# Patient Record
Sex: MALE | Race: WHITE
[De-identification: names, ages, dates, MRNs, and addresses within clinical notes are randomized per-mention and may not be internally consistent; named-entity substitution may affect disease eponyms.]

---

## 2017-12-12 NOTE — EDM.PDOC
ED HPI GENERAL MEDICAL PROBLEM





- General


Chief Complaint: General


Stated Complaint: NAUSUA


Time Seen by Provider: 12/12/17 08:53


Source of Information: Reports: Patient


History Limitations: Reports: No Limitations





- History of Present Illness


INITIAL COMMENTS - FREE TEXT/NARRATIVE: 





c/o malaise x 2d





from [place], goes to [facility]





had L TKR in [place] 4d ago, d/c'ed home 3d ago, had malaise x 2d, had apt at 4p 

at [facility] yesterday but "felt too bad to go,", had bitemporal HA 

yesterday PM that was worse on awakening today, took APAP x 2 and decided to 

come to ED





holding head in his hands





also has had urinary freq x 3d, no dysuria, no bladder or prostate problems in 

past





has urge to void, voids only a little, drinking and eating little





pt voided before coming to ED, bladder scan with 880 cc, will place an 

indwelling alvarez





not taken meds for his knee, pain 1/10 for knee and 7/10 for HA, states his 

bladder issue is 7/10 in terms of being troublesome (no pain however)


  ** Headache


Pain Score (Numeric/FACES): 7





- Related Data


 Allergies











Allergy/AdvReac Type Severity Reaction Status Date / Time


 


codeine Allergy  Nausea Verified 12/12/17 08:35


 


Penicillins Allergy  Nausea Verified 12/12/17 08:35











Home Meds: 


 Home Meds





Aspirin [Adult Low Dose Aspirin EC] 81 mg PO DAILY 06/21/14 [History]


Allopurinol [Zyloprim] 100 mg PO DAILY 12/12/17 [History]


Celecoxib [Celecoxib] 200 mg PO DAILY 12/12/17 [History]


oxyCODONE [oxyCODONE] 5 mg PO Q4HR PRN 12/12/17 [History]


traMADol HCl [Tramadol HCl] 50 mg PO Q6HR PRN 12/12/17 [History]











Past Medical History


Musculoskeletal History: Reports: Gout





- Past Surgical History


Musculoskeletal Surgical History: Reports: Hip Replacement, Knee Replacement





Social & Family History





- Tobacco Use


Smoking Status *Q: Never Smoker





- Caffeine Use


Caffeine Use: Reports: Coffee





- Recreational Drug Use


Recreational Drug Use: No





ED ROS GENERAL





- Review of Systems


Review Of Systems: See Below


Constitutional: Reports: Chills, Malaise, Decreased Appetite, Other (has had 

flu vax, no fever).  Denies: Fever, Night Sweats, Diaphoresis


HEENT: Reports: No Symptoms


Respiratory: Reports: No Symptoms


Cardiovascular: Reports: No Symptoms


Endocrine: Reports: No Symptoms


GI/Abdominal: Reports: No Symptoms


: Reports: No Symptoms


Musculoskeletal: Reports: No Symptoms


Skin: Reports: No Symptoms


Neurological: Reports: No Symptoms


Psychiatric: Reports: No Symptoms


Hematologic/Lymphatic: Reports: No Symptoms


Immunologic: Reports: No Symptoms





ED EXAM, GENERAL





- Physical Exam


Exam: See Below


Exam Limited By: No Limitations


General Appearance: Alert, WD/WN, Mild Distress, Other (sitting in chair, 

holding head in his hands, leaning forward)


Eye Exam: Bilateral Eye: Normal Inspection, PERRL


Ears: Normal External Exam, Normal Canal, Hearing Grossly Normal


Nose: Normal Inspection, Normal Mucosa, No Blood


Throat/Mouth: Normal Inspection, Normal Lips, Normal Teeth, Normal Gums, Normal 

Oropharynx, Normal Voice, No Airway Compromise


Head: Atraumatic, Normocephalic


Neck: Normal Inspection, Supple, Non-Tender, Full Range of Motion


Respiratory/Chest: No Respiratory Distress, Lungs Clear, Normal Breath Sounds, 

No Accessory Muscle Use, Chest Non-Tender


Cardiovascular: Regular Rate, Rhythm, No Edema, No Gallop, No JVD, No Murmur, 

No Rub


GI/Abdominal: Normal Bowel Sounds, Soft, No Organomegaly, No Distention, Other (

slight tender LLQ just to L of midline)


Back Exam: Normal Inspection.  No: CVA Tenderness (R), CVA Tenderness (L)


Extremities: Other (trace pretib edema R and trace to 1+ on L)


Neurological: Alert, Oriented, CN II-XII Intact, Normal Cognition, No Motor/

Sensory Deficits


Psychiatric: Normal Affect, Normal Mood


Skin Exam: Warm, Dry, Intact, Normal Color, No Rash


Lymphatic: No Adenopathy





Course





- Vital Signs


Last Recorded V/S: 


 Last Vital Signs











Temp  36.2 C   12/12/17 08:40


 


Pulse  90   12/12/17 08:40


 


Resp  24 H  12/12/17 08:40


 


BP  110/64   12/12/17 08:40


 


Pulse Ox  100   12/12/17 08:40














- Orders/Labs/Meds


Orders: 


 Active Orders 24 hr











 Category Date Time Status


 


 Bladder Scan [RC] ONETIME Care  12/12/17 08:43 Active


 


 Insert Alvarez Catheter [Insert Urinary Catheter] [OM.PC] Care  12/12/17 09:00 

Ordered





 Q24H   


 


 Urinary Catheter Assessment [RC] QSHIFT Care  12/12/17 08:58 Active


 


 PROSTATE SPECIFIC ANTIGEN [REF] Stat Lab  12/12/17 10:57 Ordered


 


 Sodium Chloride 0.9% [Normal Saline] 1,000 ml Med  12/12/17 09:45 Active





 IV ASDIRECTED   


 


 Sodium Chloride 0.9% [Saline Flush] Med  12/12/17 09:12 Active





 10 ml FLUSH ASDIRECTED PRN   


 


 Saline Lock Insert [OM.PC] Routine Oth  12/12/17 09:12 Ordered








 Medication Orders





Sodium Chloride (Normal Saline)  1,000 mls @ 999 mls/hr IV ASDIRECTED ELY


   Last Admin: 12/12/17 10:00  Dose: 999 mls/hr


Sodium Chloride (Saline Flush)  10 ml FLUSH ASDIRECTED PRN


   PRN Reason: Keep Vein Open


   Last Admin: 12/12/17 10:00  Dose: 10 ml


   Admin: 12/12/17 09:30  Dose: 10 ml








Labs: 


 Laboratory Tests











  12/12/17 12/12/17 12/12/17 Range/Units





  09:05 09:05 09:06 


 


WBC  10.6    (4.5-12.0)  X10-3/uL


 


RBC  4.92    (4.30-5.75)  x10(6)uL


 


Hgb  14.7    (11.5-15.5)  g/dL


 


Hct  43.3    (30.0-51.3)  %


 


MCV  88.0    (80-96)  fL


 


MCH  29.8    (27.7-33.6)  pg


 


MCHC  33.9    (32.2-35.4)  g/dL


 


RDW  11.9    (11.5-15.5)  %


 


Plt Count  360    (125-369)  X10(3)uL


 


MPV  7.8    (7.4-10.4)  fL


 


Neut % (Auto)  80.3    (46-82)  %


 


Lymph % (Auto)  13.5    (13-37)  %


 


Mono % (Auto)  5.1    (4-12)  %


 


Eos % (Auto)  1    (1.0-5.0)  %


 


Baso % (Auto)  0    (0-2)  %


 


Neut # (Auto)  8.6 H    (1.6-8.3)  #


 


Lymph # (Auto)  1.4    (0.6-5.0)  #


 


Mono # (Auto)  0.5    (0.0-1.3)  #


 


Eos # (Auto)  0.1    (0.0-0.8)  #


 


Baso # (Auto)  0.0    (0.0-0.2)  #


 


Sodium   140   (135-145)  mmol/L


 


Potassium   3.7   (3.5-5.3)  mmol/L


 


Chloride   102   (100-110)  mmol/L


 


Carbon Dioxide   28   (21-32)  mmol/L


 


BUN   14   (7-18)  mg/dL


 


Creatinine   1.1   (0.70-1.30)  mg/dL


 


Est Cr Clr Drug Dosing   78.38   mL/min


 


Estimated GFR (MDRD)   > 60   (>60)  


 


BUN/Creatinine Ratio   12.7   (9-20)  


 


Glucose   108   ()  mg/dL


 


Calcium   9.3   (8.6-10.2)  mg/dL


 


Total Bilirubin   1.4 H   (0.1-1.3)  mg/dL


 


AST   18   (5-25)  IU/L


 


ALT   24   (12-36)  U/L


 


Alkaline Phosphatase   60   ()  IU/L


 


Total Protein   7.5   (6.0-8.0)  g/dL


 


Albumin   3.3   (3.2-4.6)  g/dL


 


Globulin   4.2   g/dL


 


Albumin/Globulin Ratio   0.8   


 


Urine Color    Yellow  (YELLOW)  


 


Urine Appearance    Clear  (CLEAR)  


 


Urine pH    8.0 H  (5.0-6.5)  


 


Ur Specific Gravity    1.015  (1.010-1.025)  


 


Urine Protein    Negative  (NEGATIVE)  mg/dL


 


Urine Glucose (UA)    Normal  (NEGATIVE)  mg/dL


 


Urine Ketones    Negative  (NEGATIVE)  mg/dL


 


Urine Occult Blood    Negative  (NEGATIVE)  


 


Urine Nitrite    Negative  (NEGATIVE)  


 


Urine Bilirubin    Negative  (NEGATIVE)  


 


Urine Urobilinogen    Normal  (NEGATIVE)  mg/dL


 


Ur Leukocyte Esterase    Negative  (NEGATIVE)  


 


Urine WBC    0-5  (0)  


 


Ur Squamous Epith Cells    Few H  (NS,R,O)  


 


Urine Bacteria    Few H  (NS)  











Meds: 


Medications











Generic Name Dose Route Start Last Admin





  Trade Name Freq  PRN Reason Stop Dose Admin


 


Sodium Chloride  1,000 mls @ 999 mls/hr  12/12/17 09:45  12/12/17 10:00





  Normal Saline  IV   999 mls/hr





  ASDIRECTED ELY   Administration


 


Sodium Chloride  10 ml  12/12/17 09:12  12/12/17 10:00





  Saline Flush  FLUSH   10 ml





  ASDIRECTED PRN   Administration





  Keep Vein Open   














Discontinued Medications














Generic Name Dose Route Start Last Admin





  Trade Name Freq  PRN Reason Stop Dose Admin


 


Ketorolac Tromethamine  30 mg  12/12/17 08:47  12/12/17 09:27





  Toradol  IVPUSH  12/12/17 08:48  30 mg





  ONETIME ONE   Administration


 


Metoclopramide HCl  10 mg  12/12/17 08:48  12/12/17 09:28





  Reglan  IVPUSH  12/12/17 08:49  10 mg





  ONETIME ONE   Administration


 


Morphine Sulfate  4 mg  12/12/17 10:54  





  Morphine  IVPUSH  12/12/17 10:55  





  ONETIME ONE   


 


Ondansetron HCl  4 mg  12/12/17 10:55  





  Zofran  IVPUSH  12/12/17 10:56  





  ONETIME ONE   














- Re-Assessments/Exams


Free Text/Narrative Re-Assessment/Exam: 





12/12/17 10:55


feeling much better after alvarez and IVF and Toradol and Reglan, HA better, not 

gone, will give MS 4 mg IV as well as Zofran 4 mg IV and finish 1 liter NS





some effort was required to place alvarez, PSA ordered and f/u apt in 2d at 

[facility] made





Departure





- Departure


Time of Disposition: 10:57


Disposition: Home, Self-Care 01


Condition: Good


Clinical Impression: 


 Acute urinary obstruction








- Discharge Information


Instructions:  Acute Urinary Retention, Male


Referrals: 


Kody Marquez MD [Primary Care Provider] - 


Forms:  ED Department Discharge


Additional Instructions: 


To help break pain cycle, take ibuprofen 200 mg 3 tabs 4 times a day for 24 

hours.





As needed, may also use oxycodone for pain as prescribed.





See Dr Ramirez at [facility] at [address] [place] [address] at 10:30 

AM in 2 days on Thursday, clinic phone [phone].





A PSA test has been ordered. The results will be fax'ed to Dr Ramirez.





Return to ED if you are feeling worse.





Call your Physician or Return to Emergency Department if:





   * Your condition worsens in any way.


   * You develop fever greater than 100.4.


   * You have vomiting that does not stop with medications.


   * You have pain that is not controlled with medications.





- My Orders


Last 24 Hours: 


My Active Orders





12/12/17 08:43


Bladder Scan [RC] ONETIME 





12/12/17 08:58


Urinary Catheter Assessment [RC] QSHIFT 





12/12/17 09:00


Insert Alvarez Catheter [Insert Urinary Catheter] [OM.PC] Q24H 





12/12/17 09:12


Sodium Chloride 0.9% [Saline Flush]   10 ml FLUSH ASDIRECTED PRN 


Saline Lock Insert [OM.PC] Routine 





12/12/17 09:45


Sodium Chloride 0.9% [Normal Saline] 1,000 ml IV ASDIRECTED 





12/12/17 10:57


PROSTATE SPECIFIC ANTIGEN [REF] Stat 














- Assessment/Plan


Last 24 Hours: 


My Active Orders





12/12/17 08:43


Bladder Scan [RC] ONETIME 





12/12/17 08:58


Urinary Catheter Assessment [RC] QSHIFT 





12/12/17 09:00


Insert Alvarez Catheter [Insert Urinary Catheter] [OM.PC] Q24H 





12/12/17 09:12


Sodium Chloride 0.9% [Saline Flush]   10 ml FLUSH ASDIRECTED PRN 


Saline Lock Insert [OM.PC] Routine 





12/12/17 09:45


Sodium Chloride 0.9% [Normal Saline] 1,000 ml IV ASDIRECTED 





12/12/17 10:57


PROSTATE SPECIFIC ANTIGEN [REF] Stat

## 2017-12-14 NOTE — EDM.PDOC
ED HPI GENERAL MEDICAL PROBLEM





- General


Chief Complaint: Headache


Stated Complaint: SPINAL TAP HEADACHE


Time Seen by Provider: 12/14/17 19:45


Source of Information: Reports: Patient, Family


History Limitations: Reports: No Limitations





- History of Present Illness


INITIAL COMMENTS - FREE TEXT/NARRATIVE: 





Patient is a 62 year old man who 6 days ago had a left knee TKA. He was doing 

well but 3 days ago he started to develop a spinal headache. He had had 

problems urinating 5 days ago. He had nausea and vomiting 3 days ago.  He was 

seen today by urology in [place] and was then evaluated for a spinal headache 

today for 6 hours and he was told if the headache got worse he needed a blood 

patch. He was going home to [place] and the headache, nausea and vomiting got 

worse, so he has come here to the ED to have the blood patch put on. All of his 

labs were reviewed from [facility] and he had a normal  CBC, CMP and UA just 

hours ago.


Onset: Gradual


Onset Date: 12/11/17


Onset Time: 08:00


Duration: Day(s): (3), Getting Worse


Location: Reports: Head


Quality: Reports: Ache, Throbbing


Severity: Severe


Improves with: Reports: Other (Lying down)


Worsens with: Reports: Movement (Sitting up.)


Context: Reports: Other (Had epidural 6 days ago for left TKA.)


Associated Symptoms: Reports: Nausea/Vomiting


Treatments PTA: Reports: NSAIDS, Other Medication(s) (Antiemetics and oxycodone 

and tramadol.)


  ** Headache


Pain Score (Numeric/FACES): 8





- Related Data


 Allergies











Allergy/AdvReac Type Severity Reaction Status Date / Time


 


codeine Allergy  Nausea Verified 12/14/17 19:24


 


Penicillins Allergy  Nausea Verified 12/14/17 19:24











Home Meds: 


 Home Meds





Aspirin [Adult Low Dose Aspirin EC] 81 mg PO DAILY 06/21/14 [History]


Allopurinol [Zyloprim] 100 mg PO DAILY 12/12/17 [History]


Celecoxib [Celecoxib] 200 mg PO DAILY 12/12/17 [History]


oxyCODONE [oxyCODONE] 5 mg PO Q4HR PRN 12/12/17 [History]


traMADol HCl [Tramadol HCl] 50 mg PO Q6HR PRN 12/12/17 [History]











Past Medical History


HEENT History: Reports: Impaired Vision


Genitourinary History: Reports: Other (See Below)


Other Genitourinary History: alvarez in for unable to void post op


Musculoskeletal History: Reports: Gout





- Infectious Disease History


Infectious Disease History: Reports: Chicken Pox





- Past Surgical History


Musculoskeletal Surgical History: Reports: Hip Replacement, Knee Replacement


Other Musculoskeletal Surgeries/Procedures:: had lt knee replaced last friday





Social & Family History





- Tobacco Use


Smoking Status *Q: Never Smoker





- Caffeine Use


Caffeine Use: Reports: None





- Recreational Drug Use


Recreational Drug Use: No





ED ROS GENERAL





- Review of Systems


Review Of Systems: See Below


Constitutional: Reports: Decreased Appetite


HEENT: Reports: Other (Headache.)


Respiratory: Reports: No Symptoms


Cardiovascular: Reports: No Symptoms


Endocrine: Reports: No Symptoms


GI/Abdominal: Reports: Nausea, Vomiting


: Reports: No Symptoms


Musculoskeletal: Reports: No Symptoms


Skin: Reports: No Symptoms


Neurological: Reports: Headache


Psychiatric: Reports: No Symptoms


Hematologic/Lymphatic: Reports: No Symptoms


Immunologic: Reports: No Symptoms





- Physical Exam


Exam: See Below


Exam Limited By: No Limitations


General Appearance: Alert, WD/WN, No Apparent Distress


Eye Exam: Bilateral Eye: EOMI, Normal Fundi, Normal Inspection, PERRL


Ears: Normal External Exam, Normal Canal, Hearing Grossly Normal, Normal TMs


Nose: Normal Inspection, Normal Mucosa, No Blood


Throat/Mouth: Normal Inspection, Normal Lips, Normal Teeth, Normal Gums, Normal 

Oropharynx, Normal Voice, No Airway Compromise


Head Exam: Atraumatic, Normocephalic


Neck: Normal Inspection, Supple, Non-Tender, Full Range of Motion


Respiratory/Chest: No Respiratory Distress, Lungs Clear, Normal Breath Sounds, 

No Accessory Muscle Use, Chest Non-Tender


Cardiovascular: Normal Peripheral Pulses, Regular Rate, Rhythm, No Edema, No 

Gallop, No JVD, No Murmur, No Rub


GI/Abdominal: Normal Bowel Sounds, Soft, Non-Tender, No Organomegaly, No 

Distention, No Abnormal Bruit, No Mass


Neuro Exam (Abbreviated): Alert, Oriented, CN II-XII Intact, Normal Cognition, 

Normal Gait, Normal Reflexes, No Motor/Sensory Deficits


Back Exam: Normal Inspection, Full Range of Motion, NT


Extremities: Normal Inspection, Normal Range of Motion, Non-Tender, No Pedal 

Edema, Normal Capillary Refill


Psychiatric: Normal Affect, Normal Mood


Skin Exam: Warm





Course





- Vital Signs


Text/Narrative:: 





Uneventful ED course. Nurse anesthesia did a blood patch and he felt much 

better and the headache pain went away. He will go home and resume his 

scheduled pain and anti nausea medications and he will follow up with Urology 

and Orthopedics next week as scheduled.


Last Recorded V/S: 





 Last Vital Signs











Temp  36.6 C   12/14/17 19:25


 


Pulse  77   12/14/17 21:07


 


Resp  16   12/14/17 21:07


 


BP  138/76   12/14/17 21:07


 


Pulse Ox  95   12/14/17 21:07














- Orders/Labs/Meds


Orders: 





 Active Orders 24 hr











 Category Date Time Status


 


 Lactated Ringers [Ringers, Lactated] 1,000 ml Med  12/14/17 20:15 Active





 IV ASDIRECTED   


 


 Sodium Chloride 0.9% [Saline Flush] Med  12/14/17 20:09 Active





 10 ml FLUSH ASDIRECTED PRN   


 


 Saline Lock Insert [OM.PC] Routine Oth  12/14/17 20:09 Ordered








 Medication Orders





Lactated Ringer's (Ringers, Lactated)  1,000 mls @ 0 mls/hr IV ASDIRECTED ELY


   PRN Reason: KVO


   Last Admin: 12/14/17 20:26  Dose: 25 mls/hr


Sodium Chloride (Saline Flush)  10 ml FLUSH ASDIRECTED PRN


   PRN Reason: Keep Vein Open


   Last Admin: 12/14/17 20:25  Dose: 10 ml








Meds: 





Medications











Generic Name Dose Route Start Last Admin





  Trade Name Freq  PRN Reason Stop Dose Admin


 


Lactated Ringer's  1,000 mls @ 0 mls/hr  12/14/17 20:15  12/14/17 20:26





  Ringers, Lactated  IV   25 mls/hr





  ASDIRECTED ELY   Administration





  KVO   


 


Sodium Chloride  10 ml  12/14/17 20:09  12/14/17 20:25





  Saline Flush  FLUSH   10 ml





  ASDIRECTED PRN   Administration





  Keep Vein Open   














Discontinued Medications














Generic Name Dose Route Start Last Admin





  Trade Name Freq  PRN Reason Stop Dose Admin


 


Hydromorphone HCl  2 mg  12/14/17 20:13  12/14/17 20:35





  Dilaudid  IM  12/14/17 20:14  2 mg





  ONETIME ONE   Administration


 


Ondansetron HCl  4 mg  12/14/17 20:11  12/14/17 20:30





  Zofran  IVPUSH  12/14/17 20:12  4 mg





  ONETIME ONE   Administration














Departure





- Departure


Time of Disposition: 21:55


Disposition: Home, Self-Care 01


Condition: Good


Clinical Impression: 


 Headache, spinal, postoperative








- Discharge Information


Referrals: 


PCP,Not In Area [Primary Care Provider] - 





- My Orders


Last 24 Hours: 





My Active Orders





12/14/17 20:09


Sodium Chloride 0.9% [Saline Flush]   10 ml FLUSH ASDIRECTED PRN 


Saline Lock Insert [OM.PC] Routine 





12/14/17 20:15


Lactated Ringers [Ringers, Lactated] 1,000 ml IV ASDIRECTED 














- Assessment/Plan


Last 24 Hours: 





My Active Orders





12/14/17 20:09


Sodium Chloride 0.9% [Saline Flush]   10 ml FLUSH ASDIRECTED PRN 


Saline Lock Insert [OM.PC] Routine 





12/14/17 20:15


Lactated Ringers [Ringers, Lactated] 1,000 ml IV ASDIRECTED

## 2020-03-31 ENCOUNTER — HOSPITAL ENCOUNTER (EMERGENCY)
Dept: HOSPITAL 7 - FB.ED | Age: 65
Discharge: HOME | End: 2020-03-31
Payer: COMMERCIAL

## 2020-03-31 DIAGNOSIS — K57.30: Primary | ICD-10-CM

## 2020-03-31 DIAGNOSIS — Z88.0: ICD-10-CM

## 2020-03-31 DIAGNOSIS — Z88.5: ICD-10-CM

## 2020-03-31 DIAGNOSIS — Z79.82: ICD-10-CM

## 2020-03-31 DIAGNOSIS — Z79.899: ICD-10-CM

## 2020-03-31 DIAGNOSIS — M10.9: ICD-10-CM

## 2020-03-31 NOTE — EDM.PDOC
ED HPI GENERAL MEDICAL PROBLEM





- General


Chief Complaint: Gastrointestinal Problem


Stated Complaint: LOWER ABD PAIN


Time Seen by Provider: 03/31/20 07:26





- History of Present Illness


INITIAL COMMENTS - FREE TEXT/NARRATIVE: 





Usama comes in with 3 days of diarrhea and lower abdominal cramping pain. Sxs 

were gradual in onset, without back pain, nausea or vomiting, and no voiding 

sxs. He did consume popcorn a couple of days before onset of sxs. He has a 

known hx of diverticulosis detected at a colonscopy about 2 mos ago, report 

reviewed. He has tried no meds. 





- Related Data


 Allergies











Allergy/AdvReac Type Severity Reaction Status Date / Time


 


codeine Allergy  Nausea Verified 03/31/20 07:38


 


Penicillins Allergy  Nausea Verified 03/31/20 07:38











Home Meds: 


 Home Meds





Aspirin [Adult Low Dose Aspirin EC] 81 mg PO DAILY 06/21/14 [History]


Allopurinol [Zyloprim] 100 mg PO DAILY 12/12/17 [History]


Celecoxib 200 mg PO DAILY 12/12/17 [History]


oxyCODONE 5 mg PO Q4HR PRN 12/12/17 [History]


traMADol HCl [Tramadol HCl] 50 mg PO Q6HR PRN 12/12/17 [History]


Ciprofloxacin HCl [Cipro] 500 mg PO BID #14 tablet 03/31/20 [Rx]


metroNIDAZOLE [Metronidazole] 500 mg PO TID #20 tablet 03/31/20 [Rx]











Past Medical History


HEENT History: Reports: Impaired Vision


Genitourinary History: Reports: Other (See Below)


Other Genitourinary History: alvarez in for unable to void post op


Musculoskeletal History: Reports: Gout





- Infectious Disease History


Infectious Disease History: Reports: Chicken Pox





- Past Surgical History


Musculoskeletal Surgical History: Reports: Hip Replacement, Knee Replacement


Other Musculoskeletal Surgeries/Procedures:: had lt knee replaced last friday





Social & Family History





- Caffeine Use


Caffeine Use: Reports: None





ED ROS GENERAL





- Review of Systems


Review Of Systems: See Below


Constitutional: Reports: Decreased Appetite, Other (Momentary sweats before 

onset of diarrhea)


HEENT: Reports: No Symptoms


Respiratory: Reports: No Symptoms


Cardiovascular: Reports: No Symptoms


Endocrine: Reports: No Symptoms


GI/Abdominal: Reports: Abdominal Pain (LLQ), Diarrhea, Decreased Appetite


: Reports: No Symptoms


Musculoskeletal: Reports: No Symptoms


Skin: Reports: No Symptoms


Neurological: Reports: No Symptoms, Change in Speech


Hematologic/Lymphatic: Reports: No Symptoms


Immunologic: Reports: No Symptoms





ED EXAM, GI/ABD





- Physical Exam


Exam: See Below


Exam Limited By: No Limitations


General Appearance: Alert, WD/WN, No Apparent Distress


Ears: Normal External Exam, Normal TMs


Nose: Normal Inspection


Throat/Mouth: Normal Inspection, Normal Oropharynx


Head: Normocephalic


Neck: Normal Inspection


Respiratory/Chest: No Respiratory Distress, Lungs Clear, No Accessory Muscle Use


Cardiovascular: Regular Rate, Rhythm, No Murmur


GI/Abdominal Exam: Normal Bowel Sounds, Soft, No Organomegaly, No Distention, 

No Mass, Tender (mild, LLQ)


 (Male) Exam: No Hernia, Normal Inspection, Normal Prostate


Rectal (Males) Exam: Normal Exam, Heme - Stool


Back Exam: Normal Inspection


Extremities: Normal Inspection


Neurological: Alert, Oriented, CN II-XII Intact, Normal Cognition, Normal Gait, 

No Motor/Sensory Deficits


Psychiatric: Normal Affect, Normal Mood


Skin Exam: Warm, Dry, Intact, Normal Color, No Rash


Lymphatic: No Adenopathy





Course





- Vital Signs


Text/Narrative:: 





Screening labs were negative. A review of a recent colonscopy noted 

diverticulosis. 


Last Recorded V/S: 


 Last Vital Signs











Temp  36.3 C   03/31/20 07:19


 


Pulse  53 L  03/31/20 07:19


 


Resp  16   03/31/20 07:19


 


BP  129/73   03/31/20 07:19


 


Pulse Ox  97   03/31/20 07:19














- Orders/Labs/Meds


Labs: 


 Laboratory Tests











  03/31/20 03/31/20 03/31/20 Range/Units





  07:25 07:50 07:50 


 


WBC   10.5   (4.5-12.0)  X10-3/uL


 


RBC   5.84 H   (4.30-5.75)  x10(6)uL


 


Hgb   17.5   (13.5-17.8)  g/dL


 


Hct   51.6 H   (30.0-51.3)  %


 


MCV   88.4   (80-96)  fL


 


MCH   29.9   (27.7-33.6)  pg


 


MCHC   33.8   (32.2-35.4)  g/dL


 


RDW   12.4   (11.5-15.5)  %


 


Plt Count   416 H   (125-369)  X10(3)uL


 


MPV   7.4   (7.4-10.4)  fL


 


Neut % (Auto)   68.4   (46-82)  %


 


Lymph % (Auto)   23.8   (13-37)  %


 


Mono % (Auto)   6.9   (4-12)  %


 


Eos % (Auto)   1   (1.0-5.0)  %


 


Baso % (Auto)   0   (0-2)  %


 


Neut # (Auto)   7.2   (1.6-8.3)  #


 


Lymph # (Auto)   2.5   (0.6-5.0)  #


 


Mono # (Auto)   0.7   (0.0-1.3)  #


 


Eos # (Auto)   0.1   (0.0-0.8)  #


 


Baso # (Auto)   0.0   (0.0-0.2)  #


 


Sodium    140  (135-145)  mmol/L


 


Potassium    4.1  (3.5-5.3)  mmol/L


 


Chloride    102  (100-110)  mmol/L


 


Carbon Dioxide    25  (21-32)  mmol/L


 


BUN    15  (7-18)  mg/dL


 


Creatinine    1.1  (0.70-1.30)  mg/dL


 


Est Cr Clr Drug Dosing    75.44  mL/min


 


Estimated GFR (MDRD)    > 60  (>60)  


 


BUN/Creatinine Ratio    13.6  (9-20)  


 


Glucose    112  ()  mg/dL


 


Calcium    8.9  (8.6-10.2)  mg/dL


 


Total Bilirubin    0.9  (0.1-1.3)  mg/dL


 


AST    29 H D  (5-25)  IU/L


 


ALT    40 H D  (12-36)  U/L


 


Alkaline Phosphatase    67  ()  IU/L


 


Total Protein    8.2 H  (6.0-8.0)  g/dL


 


Albumin    4.1  (3.2-4.6)  g/dL


 


Globulin    4.1  g/dL


 


Albumin/Globulin Ratio    1.0  


 


Urine Color  Yellow    (YELLOW)  


 


Urine Appearance  Clear    (CLEAR)  


 


Urine pH  5.0    (5.0-6.5)  


 


Ur Specific Gravity  1.025    (1.010-1.025)  


 


Urine Protein  Negative    (NEGATIVE)  mg/dL


 


Urine Glucose (UA)  Normal    (NORMAL)  mg/dL


 


Urine Ketones  Negative    (NEGATIVE)  mg/dL


 


Urine Occult Blood  Negative    (NEGATIVE)  


 


Urine Nitrite  Negative    (NEGATIVE)  


 


Urine Bilirubin  Negative    (NEGATIVE)  


 


Urine Urobilinogen  Normal    (NEGATIVE)  mg/dL


 


Ur Leukocyte Esterase  Negative    (NEGATIVE)  


 


Urine WBC  0-5    (0-5)  


 


Ur Squamous Epith Cells  Occasional    (NS,R,O)  


 


Urine Bacteria  Few H    (NS)  














Departure





- Departure


Time of Disposition: 09:00


Disposition: Home, Self-Care 01


Condition: Fair


Clinical Impression: 


 Diverticulosis of sigmoid colon








- Discharge Information


*PRESCRIPTION DRUG MONITORING PROGRAM REVIEWED*: Not Applicable


*COPY OF PRESCRIPTION DRUG MONITORING REPORT IN PATIENT RAE: Not Applicable


Prescriptions: 


Ciprofloxacin HCl [Cipro] 500 mg PO BID #14 tablet


metroNIDAZOLE [Metronidazole] 500 mg PO TID #20 tablet


Forms:  ED Department Discharge





Sepsis Event Note





- Focused Exam


Vital Signs: 


 Vital Signs











  Temp Pulse Resp BP Pulse Ox


 


 03/31/20 07:19  36.3 C  53 L  16  129/73  97











Date Exam was Performed: 03/31/20


Time Exam was Performed: 08:47





- Problem List & Annotations


(1) Diverticulosis of sigmoid colon


SNOMED Code(s): 512339581


   Code(s): K57.30 - DVRTCLOS OF LG INT W/O PERFORATION OR ABSCESS W/O BLEEDING

   Status: Acute   Current Visit: Yes   Annotation/Comment:: Probable 

diverticulosis. I dispensed Metronidazole 500 mg tid and Cipro 500 mg bid for a 

week, low residue diet, analgesic of choice.    





- Problem List Review


Problem List Initiated/Reviewed/Updated: Yes





- Assessment/Plan


Plan: 





Follow up with PCP if needed.

## 2021-01-29 ENCOUNTER — HOSPITAL ENCOUNTER (EMERGENCY)
Dept: HOSPITAL 7 - FB.ED | Age: 66
Discharge: HOME | End: 2021-01-29
Payer: COMMERCIAL

## 2021-01-29 DIAGNOSIS — Z79.82: ICD-10-CM

## 2021-01-29 DIAGNOSIS — Z88.0: ICD-10-CM

## 2021-01-29 DIAGNOSIS — Z88.5: ICD-10-CM

## 2021-01-29 DIAGNOSIS — N41.9: Primary | ICD-10-CM

## 2021-01-29 PROCEDURE — 99283 EMERGENCY DEPT VISIT LOW MDM: CPT

## 2021-01-29 PROCEDURE — 96372 THER/PROPH/DIAG INJ SC/IM: CPT

## 2021-01-29 PROCEDURE — 81001 URINALYSIS AUTO W/SCOPE: CPT

## 2021-01-29 PROCEDURE — 87086 URINE CULTURE/COLONY COUNT: CPT

## 2021-01-29 PROCEDURE — 84153 ASSAY OF PSA TOTAL: CPT

## 2021-01-29 PROCEDURE — 36415 COLL VENOUS BLD VENIPUNCTURE: CPT

## 2021-01-29 PROCEDURE — 84154 ASSAY OF PSA FREE: CPT

## 2021-01-29 NOTE — EDM.PDOC
ED HPI GENERAL MEDICAL PROBLEM





- General


Chief Complaint: General


Stated Complaint: LOWER ABD PAIN/GROIN PAIN


Time Seen by Provider: 01/29/21 19:10


Source of Information: Reports: Patient


History Limitations: Reports: No Limitations





- History of Present Illness


INITIAL COMMENTS - FREE TEXT/NARRATIVE: 





suprapubic and perineal pain on an off with chills on occasion for 2 weeks , t

ubaldo pain was very severe 8/10 and he had chill s


pt also noted pain in the penile base area ,


pt has noted dribbling and straining to  urinate with increased frequency


denies STD . No prior history of renal calculi





Onset: Today


Onset Date: 01/29/21


Duration: Getting Worse


Quality: Reports: Ache, Dull, Pressure


Severity: Mild


Improves with: Reports: None


Worsens with: Reports: None


Associated Symptoms: Reports: No Other Symptoms


Treatments PTA: Reports: Acetaminophen


  ** Abdomen/penis


Pain Score (Numeric/FACES): 5





- Related Data


                                    Allergies











Allergy/AdvReac Type Severity Reaction Status Date / Time


 


codeine Allergy  Nausea Verified 03/31/20 07:38


 


Penicillins Allergy  Vomiting Verified 01/29/21 19:36











Home Meds: 


                                    Home Meds





Aspirin [Adult Low Dose Aspirin EC] 81 mg PO DAILY 06/21/14 [History]


Allopurinol [Zyloprim] 100 mg PO DAILY 12/12/17 [History]


Ciprofloxacin HCl [Cipro] 500 mg PO BID #20 tablet 01/29/21 [Rx]


Tamsulosin HCl [Flomax] 0.4 mg PO DAILY #30 cap.er.24h 01/29/21 [Rx]











Past Medical History


HEENT History: Reports: Impaired Vision


Gastrointestinal History: Reports: Other (See Below)


Other Gastrointestinal History: Diverticulitis


Genitourinary History: Reports: Other (See Below)


Other Genitourinary History: alvarez in for unable to void post op


Musculoskeletal History: Reports: Arthritis, Gout





- Infectious Disease History


Infectious Disease History: Reports: Chicken Pox


Other Infectious Disease History: denies history of c diff and MRSA





- Past Surgical History


Musculoskeletal Surgical History: Reports: Hip Replacement, Knee Replacement


Other Musculoskeletal Surgeries/Procedures:: had lt knee replaced last friday





Social & Family History





- Family History


Family Medical History: No Pertinent Family History





- Tobacco Use


Tobacco Use Status *Q: Never Tobacco User





- Caffeine Use


Caffeine Use: Reports: Coffee





- Recreational Drug Use


Recreational Drug Use: No





ED ROS GENERAL





- Review of Systems


Review Of Systems: Comprehensive ROS is negative, except as noted in HPI.





ED EXAM, GENERAL





- Physical Exam


Exam: See Below


Exam Limited By: No Limitations


General Appearance: Alert, WD/WN, No Apparent Distress


Eye Exam: Bilateral Eye: EOMI


Ear Exam: Bilateral Ear: Discharge


Nose: Normal Inspection


Throat/Mouth: Normal Inspection, Normal Oropharynx


Head: Normocephalic, Sinus Tenderness


Respiratory/Chest: No Respiratory Distress, Lungs Clear


Cardiovascular: Regular Rate, Rhythm


Back Exam: Normal Inspection, Full Range of Motion


Extremities: Normal Inspection, Normal Range of Motion


Neurological: Alert, Oriented, CN II-XII Intact


Psychiatric: Normal Affect, Normal Mood


Skin Exam: Warm





Course





- Vital Signs


Last Recorded V/S: 


                                Last Vital Signs











Temp  36.4 C   01/29/21 20:00


 


Pulse  86   01/29/21 20:00


 


Resp  16   01/29/21 20:00


 


BP  132/79   01/29/21 20:00


 


Pulse Ox  98   01/29/21 20:00














- Orders/Labs/Meds


Orders: 


                               Active Orders 24 hr











 Category Date Time Status


 


 CULTURE URINE [RM] Stat Lab  01/29/21 19:45 Ordered


 


 PSA TOTAL+% FREE Stat Lab  01/29/21 19:56 Ordered











Labs: 


                                Laboratory Tests











  01/29/21 Range/Units





  19:20 


 


Urine Color  Yellow  (YELLOW)  


 


Urine Appearance  Clear  (CLEAR)  


 


Urine pH  6.0  (5.0-6.5)  


 


Ur Specific Gravity  1.020  (1.010-1.025)  


 


Urine Protein  Negative  (NEGATIVE)  mg/dL


 


Urine Glucose (UA)  Normal  (NORMAL)  mg/dL


 


Urine Ketones  Negative  (NEGATIVE)  mg/dL


 


Urine Occult Blood  Moderate H  (NEGATIVE)  


 


Urine Nitrite  Negative  (NEGATIVE)  


 


Urine Bilirubin  Negative  (NEGATIVE)  


 


Urine Urobilinogen  Normal  (NEGATIVE)  mg/dL


 


Ur Leukocyte Esterase  Negative  (NEGATIVE)  


 


Urine RBC  5-10 H  (0-5)  


 


Urine WBC  0-5  (0-5)  


 


Ur Squamous Epith Cells  Few H  (NS,R,O)  


 


Urine Bacteria  Few H  (NS)  


 


Urine Mucus  Few H  (NS)  











Meds: 


Medications














Discontinued Medications














Generic Name Dose Route Start Last Admin





  Trade Name Freq  PRN Reason Stop Dose Admin


 


Ciprofloxacin  500 mg  01/29/21 19:46  01/29/21 19:49





  Ciprofloxacin Hcl  PO  01/29/21 19:47  500 mg





  ONETIME ONE   Administration


 


Ketorolac Tromethamine  60 mg  01/29/21 19:46  01/29/21 19:49





  Toradol  IM  01/29/21 19:47  60 mg





  ONETIME ONE   Administration


 


Tamsulosin HCl  0.4 mg  01/29/21 19:52  01/29/21 19:55





  Flomax  PO  01/29/21 19:53  0.4 mg





  ONETIME ONE   Administration














- Re-Assessments/Exams


Free Text/Narrative Re-Assessment/Exam: 





01/29/21 19:58


pt had UA done : showed mainly blood 


Will treat as prostatitis vs UTI, PSA ordered 


Will treat with Cipro and Flomax


Pt will FU w PCP





Departure





- Departure


Time of Disposition: 20:05


Disposition: Home, Self-Care 01


Condition: Fair


Clinical Impression: 


 Prostatitis, acute, Hematuria, Dysuria








- Discharge Information


*PRESCRIPTION DRUG MONITORING PROGRAM REVIEWED*: Not Applicable


*COPY OF PRESCRIPTION DRUG MONITORING REPORT IN PATIENT RAE: Not Applicable


Prescriptions: 


Ciprofloxacin HCl [Cipro] 500 mg PO BID #20 tablet


Tamsulosin HCl [Flomax] 0.4 mg PO DAILY #30 cap.er.24h


Instructions:  Prostatitis, Easy-to-Read, Hematuria, Adult


Referrals: 


PCP,None [Primary Care Provider] - 


Forms:  ED Department Discharge


Additional Instructions: 


1) make appointment  to see your PCP in 10 days ( after completion of ant

ibiotics)


2) You will need repeat PSA 


3) Increase fluid intake 


4) Call with any concerns





Sepsis Event Note (ED)





- Evaluation


Sepsis Screening Result: No Definite Risk





- Focused Exam


Vital Signs: 


                                   Vital Signs











  Temp Pulse Resp BP Pulse Ox


 


 01/29/21 20:00  36.4 C  86  16  132/79  98


 


 01/29/21 19:05  35.8 C L  86  16  145/78 H  96














- My Orders


Last 24 Hours: 


My Active Orders





01/29/21 19:45


CULTURE URINE [RM] Stat 





01/29/21 19:56


PSA TOTAL+% FREE Stat 














- Assessment/Plan


Last 24 Hours: 


My Active Orders





01/29/21 19:45


CULTURE URINE [RM] Stat 





01/29/21 19:56


PSA TOTAL+% FREE Stat

## 2021-01-30 ENCOUNTER — HOSPITAL ENCOUNTER (EMERGENCY)
Dept: HOSPITAL 7 - FB.ED | Age: 66
Discharge: HOME | End: 2021-01-30
Payer: COMMERCIAL

## 2021-01-30 DIAGNOSIS — M10.9: ICD-10-CM

## 2021-01-30 DIAGNOSIS — N13.2: Primary | ICD-10-CM

## 2021-01-30 DIAGNOSIS — Z79.899: ICD-10-CM

## 2021-01-30 DIAGNOSIS — Z88.5: ICD-10-CM

## 2021-01-30 DIAGNOSIS — Z88.0: ICD-10-CM

## 2021-01-30 NOTE — EDM.PDOC
ED HPI GENERAL MEDICAL PROBLEM





- General


Chief Complaint: Abdominal Pain


Stated Complaint: LOWER RT ABD PAIN


Time Seen by Provider: 01/30/21 09:55


Source of Information: Reports: Patient


History Limitations: Reports: No Limitations





- History of Present Illness


INITIAL COMMENTS - FREE TEXT/NARRATIVE: 





seen yesterday for lower abd pain , penile pain and hematuria ,started on cipro

floxacin 


Overnight pain got worse and moved from being central to the right flank: 

unbearable had to take percocet


Still has no fever or chills 


Onset: Today, Gradual


Onset Date: 01/29/21


Duration: Day(s):, Waxing/Waning


Location: Reports: Abdomen


Quality: Reports: Ache, Dull


Severity: Moderate


Improves with: Reports: None


Worsens with: Reports: Movement


Context: Reports: Activity


Associated Symptoms: Denies: Diaphoresis, Malaise, Nausea/Vomiting


  ** RLQ


Pain Score (Numeric/FACES): 5





- Related Data


                                    Allergies











Allergy/AdvReac Type Severity Reaction Status Date / Time


 


codeine Allergy  Nausea Verified 03/31/20 07:38


 


Penicillins Allergy  Vomiting Verified 01/29/21 19:36











Home Meds: 


                                    Home Meds





Aspirin [Adult Low Dose Aspirin EC] 81 mg PO DAILY 06/21/14 [History]


Allopurinol [Zyloprim] 100 mg PO DAILY 12/12/17 [History]


Ciprofloxacin HCl [Cipro] 500 mg PO BID #20 tablet 01/29/21 [Rx]


Tamsulosin HCl [Flomax] 0.4 mg PO DAILY #30 cap.er.24h 01/29/21 [Rx]


Hydrocodone/Acetaminophen [Hydrocodon-Acetaminophen 5-325] 1 each PO Q8HR PRN 

#10 tablet 01/30/21 [Rx]


predniSONE [Prednisone] 50 mg PO DAILY #5 tablet 01/30/21 [Rx]











Past Medical History


HEENT History: Reports: Impaired Vision


Gastrointestinal History: Reports: Other (See Below)


Other Gastrointestinal History: Diverticulitis


Genitourinary History: Reports: Other (See Below)


Other Genitourinary History: alvarez in for unable to void post op


Musculoskeletal History: Reports: Arthritis, Gout





- Infectious Disease History


Infectious Disease History: Reports: Chicken Pox


Other Infectious Disease History: denies history of c diff and MRSA





- Past Surgical History


Musculoskeletal Surgical History: Reports: Hip Replacement, Knee Replacement


Other Musculoskeletal Surgeries/Procedures:: had lt knee replaced last friday





Social & Family History





- Family History


Family Medical History: No Pertinent Family History





- Caffeine Use


Caffeine Use: Reports: Coffee





ED ROS GENERAL





- Review of Systems


Review Of Systems: See Below


Constitutional: Denies: Fever, Chills, Malaise, Weakness


HEENT: Reports: No Symptoms


Respiratory: Reports: No Symptoms


Cardiovascular: Reports: No Symptoms


Endocrine: Reports: No Symptoms


GI/Abdominal: Reports: Abdominal Pain, Anorexia, Decreased Appetite.  Denies: 

Constipation


: Reports: Hematuria


Musculoskeletal: Reports: No Symptoms, Muscle Pain


Skin: Reports: No Symptoms


Neurological: Reports: No Symptoms


Psychiatric: Reports: No Symptoms


Hematologic/Lymphatic: Reports: No Symptoms


Immunologic: Reports: No Symptoms





ED EXAM, GI/ABD





- Physical Exam


Exam: See Below


Exam Limited By: No Limitations


General Appearance: Alert, WD/WN, No Apparent Distress


Eyes: Bilateral: EOMI


Ears: Normal External Exam


Nose: Normal Inspection


Throat/Mouth: Normal Oropharynx


Head: Atraumatic, Normocephalic


Neck: Supple, Non-Tender


Respiratory/Chest: No Respiratory Distress, Lungs Clear


Cardiovascular: Normal Peripheral Pulses, Regular Rate, Rhythm


GI/Abdominal Exam: Normal Bowel Sounds, Soft, Non-Tender





Course





- Vital Signs


Last Recorded V/S: 


                                Last Vital Signs











Temp  36.6 C   01/30/21 09:49


 


Pulse  89   01/30/21 09:49


 


Resp  17   01/30/21 09:49


 


BP  129/110 H  01/30/21 09:49


 


Pulse Ox  95   01/30/21 09:49














- Orders/Labs/Meds


Orders: 


                               Active Orders 24 hr











 Category Date Time Status


 


 Abdomen Pelvis wo Cont [CT] Stat Exams  01/30/21 10:29 Taken


 


 Sodium Chloride 0.9% [Normal Saline] 1,000 ml Med  01/30/21 10:30 Active





 IV ASDIRECTED   








                                Medication Orders





Sodium Chloride (Normal Saline)  1,000 mls @ 999 mls/hr IV ASDIRECTED ELY


   Last Admin: 01/30/21 11:08  Dose: 999 mls/hr


   Documented by: DALJIT








Labs: 


                                Laboratory Tests











  01/30/21 01/30/21 Range/Units





  10:47 10:47 


 


WBC   11.2 H  (3.2-10.1)  x10-3/uL


 


RBC   5.32  (3.90-5.90)  x10(6)uL


 


Hgb   15.4  (12.9-17.7)  g/dL


 


Hct   45.8  (38.3-50.1)  %


 


MCV   86.1  (80.8-98.7)  fL


 


MCH   29.0  (27.0-33.3)  pg


 


MCHC   33.6  (28.7-35.3)  g/dL


 


RDW   12.6  (12.4-15.0)  %


 


Plt Count   368  (117-477)  x10(3)uL


 


MPV   7.6  (6.7-11.0)  fL


 


Neut % (Auto)   70.6  (40.3-71.8)  %


 


Lymph % (Auto)   18.1  (15.8-45.3)  %


 


Mono % (Auto)   10.7  (5.5-15.2)  %


 


Eos % (Auto)   0.3  (0.1-6.8)  %


 


Baso % (Auto)   0.3  (0.3-3.8)  %


 


Neut # (Auto)   7.9 H  (1.7-6.9)  x10-3/uL


 


Lymph # (Auto)   2.0  (0.5-4.5)  x10-3/uL


 


Mono # (Auto)   1.2  (0.0-1.2)  x10-3/uL


 


Eos # (Auto)   0.0  (0.0-0.6)  x10-3/uL


 


Baso # (Auto)   0.0  (0.0-0.3)  x10-3/uL


 


Sodium  140   (135-145)  mmol/L


 


Potassium  4.2   (3.5-5.3)  mmol/L


 


Chloride  102   (100-110)  mmol/L


 


Carbon Dioxide  27   (21-32)  mmol/L


 


BUN  28 H D   (7-18)  mg/dL


 


Creatinine  1.6 H   (0.70-1.30)  mg/dL


 


Est Cr Clr Drug Dosing  TNP   


 


Estimated GFR (MDRD)  44 L   (>60)  


 


BUN/Creatinine Ratio  17.5   (9-20)  


 


Glucose  111   ()  mg/dL


 


Calcium  8.8   (8.6-10.2)  mg/dL


 


Total Bilirubin  0.5   (0.1-1.3)  mg/dL


 


AST  18  D   (5-25)  IU/L


 


ALT  27  D   (12-36)  U/L


 


Alkaline Phosphatase  69   ()  IU/L


 


Total Protein  7.3   (6.0-8.0)  g/dL


 


Albumin  3.6   (3.2-4.6)  g/dL


 


Globulin  3.7   g/dL


 


Albumin/Globulin Ratio  1.0   











Meds: 


Medications











Generic Name Dose Route Start Last Admin





  Trade Name Juan F  PRN Reason Stop Dose Admin


 


Sodium Chloride  1,000 mls @ 999 mls/hr  01/30/21 10:30  01/30/21 11:08





  Normal Saline  IV   999 mls/hr





  ASDIRECTED ELY   Administration














Discontinued Medications














Generic Name Dose Route Start Last Admin





  Trade Name Juan F  PRN Reason Stop Dose Admin


 


Ciprofloxacin/Dextrose 400 mg/  200 mls @ 200 mls/hr  01/30/21 10:31  01/30/21 

11:08





  Premix  IV  01/30/21 11:30  200 mls/hr





  ONETIME ONE   Administration














- Re-Assessments/Exams


Free Text/Narrative Re-Assessment/Exam: 





01/30/21 11:52


CT report obtained


pt has renal calculi on the right side 


discussed treatment with pt 


currently getting IVf and flomax 


will FU with PCP 





Departure





- Departure


Time of Disposition: 13:15


Disposition: Home, Self-Care 01


Condition: Fair


Clinical Impression: 


 Right renal stone








- Discharge Information


*PRESCRIPTION DRUG MONITORING PROGRAM REVIEWED*: Not Applicable


*COPY OF PRESCRIPTION DRUG MONITORING REPORT IN PATIENT RAE: Not Applicable


Prescriptions: 


Hydrocodone/Acetaminophen [Hydrocodon-Acetaminophen 5-325] 1 each PO Q8HR PRN 

#10 tablet


 PRN Reason: Pain (Severe 7-10)


predniSONE [Prednisone] 50 mg PO DAILY #5 tablet


Instructions:  Low-Purine Eating Plan, Kidney Stones, Easy-to-Read


Referrals: 


PCP,None [Primary Care Provider] - 


Forms:  ED Department Discharge


Additional Instructions: 


1) Strain  urine , obtain stone and take to your PCP for analysis


2) Increase fluid intake 


3) Continue with all medications as prescribed , if symptoms do not resolve , 

you will need to see the urologist





Sepsis Event Note (ED)





- Focused Exam


Vital Signs: 


                                   Vital Signs











  Temp Pulse Resp BP Pulse Ox


 


 01/30/21 09:49  36.6 C  89  17  129/110 H  95














- My Orders


Last 24 Hours: 


My Active Orders





01/30/21 10:29


Abdomen Pelvis wo Cont [CT] Stat 





01/30/21 10:30


Sodium Chloride 0.9% [Normal Saline] 1,000 ml IV ASDIRECTED 














- Assessment/Plan


Last 24 Hours: 


My Active Orders





01/30/21 10:29


Abdomen Pelvis wo Cont [CT] Stat 





01/30/21 10:30


Sodium Chloride 0.9% [Normal Saline] 1,000 ml IV ASDIRECTED

## 2021-02-02 LAB
PROSTATE SPECIFIC AG, SERUM: 2.1 NG/ML (ref 0–4)
PSA FREE MFR SERPL: 18.6 %
PSA, FREE: 0.39 NG/ML